# Patient Record
Sex: MALE | Race: OTHER
[De-identification: names, ages, dates, MRNs, and addresses within clinical notes are randomized per-mention and may not be internally consistent; named-entity substitution may affect disease eponyms.]

---

## 2020-12-25 ENCOUNTER — TRANSCRIPTION ENCOUNTER (OUTPATIENT)
Age: 40
End: 2020-12-25

## 2021-01-06 ENCOUNTER — TRANSCRIPTION ENCOUNTER (OUTPATIENT)
Age: 41
End: 2021-01-06

## 2021-01-20 ENCOUNTER — TRANSCRIPTION ENCOUNTER (OUTPATIENT)
Age: 41
End: 2021-01-20

## 2021-04-20 ENCOUNTER — TRANSCRIPTION ENCOUNTER (OUTPATIENT)
Age: 41
End: 2021-04-20

## 2021-07-31 ENCOUNTER — TRANSCRIPTION ENCOUNTER (OUTPATIENT)
Age: 41
End: 2021-07-31

## 2021-09-30 ENCOUNTER — TRANSCRIPTION ENCOUNTER (OUTPATIENT)
Age: 41
End: 2021-09-30

## 2021-10-07 ENCOUNTER — TRANSCRIPTION ENCOUNTER (OUTPATIENT)
Age: 41
End: 2021-10-07

## 2022-06-21 ENCOUNTER — APPOINTMENT (OUTPATIENT)
Dept: NEUROLOGY | Facility: CLINIC | Age: 42
End: 2022-06-21
Payer: MEDICAID

## 2022-06-21 PROCEDURE — 95886 MUSC TEST DONE W/N TEST COMP: CPT

## 2022-06-21 PROCEDURE — 95912 NRV CNDJ TEST 11-12 STUDIES: CPT

## 2022-06-22 ENCOUNTER — APPOINTMENT (OUTPATIENT)
Dept: PAIN MANAGEMENT | Facility: CLINIC | Age: 42
End: 2022-06-22

## 2022-07-20 ENCOUNTER — APPOINTMENT (OUTPATIENT)
Dept: NEUROLOGY | Facility: CLINIC | Age: 42
End: 2022-07-20

## 2022-07-20 VITALS
WEIGHT: 214 LBS | DIASTOLIC BLOOD PRESSURE: 75 MMHG | HEART RATE: 91 BPM | BODY MASS INDEX: 28.36 KG/M2 | HEIGHT: 73 IN | SYSTOLIC BLOOD PRESSURE: 125 MMHG

## 2022-07-20 DIAGNOSIS — Z86.39 PERSONAL HISTORY OF OTHER ENDOCRINE, NUTRITIONAL AND METABOLIC DISEASE: ICD-10-CM

## 2022-07-20 DIAGNOSIS — Z78.9 OTHER SPECIFIED HEALTH STATUS: ICD-10-CM

## 2022-07-20 PROCEDURE — 99214 OFFICE O/P EST MOD 30 MIN: CPT

## 2022-07-20 NOTE — ASSESSMENT
[FreeTextEntry1] : 41 year old male with chronic lumbar pain with radiculopathy.  He  will continue to attend PT and f/u in 6-8 weeks for re evaluation.  If there is any issue he will contact the office.  If his pain continues at that time we will order an MRI lumbar spine for further evaluation.\par \par I personally reviewed with the PA, this patient's history and physical exam findings, as documented above. I have discussed the relevant areas of concern, having direct implications to the presenting problems and illnesses, and I have personally examined all pertinent and positive and negative findings, which impact on the prior neurological treatment. \par \par \par Obdulia Ferrera, MS, PA-C\par Jonnathan Inman MD\par

## 2022-07-20 NOTE — PHYSICAL EXAM
[General Appearance - Alert] : alert [General Appearance - In No Acute Distress] : in no acute distress [Oriented To Time, Place, And Person] : oriented to person, place, and time [Affect] : the affect was normal [Mood] : the mood was normal [Memory Recent] : recent memory was not impaired [Memory Remote] : remote memory was not impaired [Person] : oriented to person [Place] : oriented to place [Time] : oriented to time [Remote Intact] : remote memory intact [Registration Intact] : recent registration memory intact [Motor Tone] : muscle tone was normal in all four extremities [Motor Strength] : muscle strength was normal in all four extremities [Involuntary Movements] : no involuntary movements were seen [Abnormal Walk] : normal gait [FreeTextEntry1] : mild pain with flexion, decrease ROM

## 2022-07-20 NOTE — HISTORY OF PRESENT ILLNESS
[FreeTextEntry1] : Mr. Green is a 41-year-old  who presents today in neurologic consultation for lower back pain which began about a month ago when he was carrying his  child and leaned forward at the waist. He developed severe lower back pain with radiation into the left buttock and lower extremity. Motrin improved his symptoms. When pain reoccurred, he took one of his wife's oxycodone which helped temporarily. Since that time, he has had recurrence of his pain especially when bending over. He has not had sciatic pain in the past. Denies injury, accident, or fall. He currently does not do any heavy lifting at work.\par \par TODAY: I had the pleasure of seeing Mr. Green today in follow up.  His previous history and physical findings have been reviewed.\par \par He is under our care for chronic lumbar pain with radiculopathy which he is receiving continuing active treatment for.  He underwent X-ray lumbar spine and EMG lower extremities for further evaluation of his complaints and presents today to review the results.  EMG of the lower extremities was normal and X-ray lumbar spine revealed degenerative disc L5-S1.  MRI was denied as patient needed to trial 6 weeks of PT which he is still attending.  He feels there has been some improvement since his initial visit stating his pain varies from a 1-5/10.  He states if he turns a certain way he will experience a jolt of pain that lasts less than a second but doesn’t  happen often.  He is not using medication and we have recommended he undergo PT over the next several weeks.

## 2022-09-15 ENCOUNTER — APPOINTMENT (OUTPATIENT)
Dept: NEUROLOGY | Facility: CLINIC | Age: 42
End: 2022-09-15

## 2022-09-15 VITALS
WEIGHT: 220 LBS | SYSTOLIC BLOOD PRESSURE: 135 MMHG | HEART RATE: 81 BPM | HEIGHT: 73 IN | BODY MASS INDEX: 29.16 KG/M2 | DIASTOLIC BLOOD PRESSURE: 83 MMHG

## 2022-09-15 DIAGNOSIS — M54.16 RADICULOPATHY, LUMBAR REGION: ICD-10-CM

## 2022-09-15 DIAGNOSIS — M48.061 SPINAL STENOSIS, LUMBAR REGION WITHOUT NEUROGENIC CLAUDICATION: ICD-10-CM

## 2022-09-15 DIAGNOSIS — Z00.00 ENCOUNTER FOR GENERAL ADULT MEDICAL EXAMINATION W/OUT ABNORMAL FINDINGS: ICD-10-CM

## 2022-09-15 PROCEDURE — 99213 OFFICE O/P EST LOW 20 MIN: CPT

## 2022-09-15 NOTE — HISTORY OF PRESENT ILLNESS
[FreeTextEntry1] : Mr. Green is a 41-year-old  who presents today in neurologic consultation for lower back pain which began about a month ago when he was carrying his  child and leaned forward at the waist. He developed severe lower back pain with radiation into the left buttock and lower extremity. Motrin improved his symptoms. When pain reoccurred, he took one of his wife's oxycodone which helped temporarily. Since that time, he has had recurrence of his pain especially when bending over. He has not had sciatic pain in the past. Denies injury, accident, or fall. He currently does not do any heavy lifting at work.\par \par TODAY: I had the pleasure of seeing Mr. Green today in follow up.  His previous history and physical findings have been reviewed.\par \par He is under our care for chronic lumbar pain with radiculopathy which he is receiving continuing active treatment for.  He states he has been doing much better since his last visit.  He had been attending PT 1-2 times per week for at least 8 weeks stating his pain and ROM have improved.  He is currently experiencing no pain rating it a 0/10 and states at worst is a 2/10.  He states he has not tried running or playing soccer as of it but will ease back into it  to see how he does.  We will hold off with respect to any medication or testing at this time and he is agreeable.

## 2022-09-15 NOTE — ASSESSMENT
[FreeTextEntry1] : 41 year old male with chronic lumbar pain with radiculopathy.  He will continue follow up as needed and is aware if there is any issue he will contact the office.\par \par I personally reviewed with the PA, this patient's history and physical exam findings, as documented above. I have discussed the relevant areas of concern, having direct implications to the presenting problems and illnesses, and I have personally examined all pertinent and positive and negative findings, which impact on the prior neurological treatment. \par \par \par Obdulia Ferrera, MS, PA-C\par Jonnathan Inman MD\par